# Patient Record
(demographics unavailable — no encounter records)

---

## 2024-11-05 NOTE — ASSESSMENT
[FreeTextEntry1] : suspect majority of problem is due to trigger finger of left 3rd digit  suggested and injected 5mg kenalog intop left 3rd A1 pulley  f/u 6 wks if still sx  Regarding treatment of trigger finger patient aware of possible lack of response to steroid injection and possible need for additional steroid injection or possible surgical trigger finger release.  All questions answered

## 2024-11-05 NOTE — HISTORY OF PRESENT ILLNESS
[FreeTextEntry1] : 61 yr old woman seen for left third finger arthritis  LHD  no injury  nonsmoker nondiabetic works as RN in [employer]   right hand fine left hand fine except for third finger

## 2024-11-05 NOTE — PHYSICAL EXAM
[NI] : Normal [de-identified] : left third finger tenderness over A1 pulley   mild 3rd PIPJ swelling

## 2024-12-17 NOTE — PHYSICAL EXAM
[NI] : Normal [de-identified] : left third finger tenderness over A1 pulley   mild 3rd PIPJ swelling

## 2024-12-17 NOTE — HISTORY OF PRESENT ILLNESS
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

## 2024-12-17 NOTE — ASSESSMENT
[FreeTextEntry1] : suspect majority of problem is due to trigger finger of left 3rd digit  suggested and injected 5mg kenalog intop left 3rd A1 pulley  f/u 6 wks if still sx  Regarding treatment of trigger finger patient aware of possible lack of response to steroid injection and possible need for additional steroid injection or possible surgical trigger finger release.  All questions answered  12/17/2024 as above some intermittent triggering left third  over left third A1 pulley tolerated well  Regarding treatment of trigger finger patient aware of possible lack of response to steroid injection and possible need for additional steroid injection or possible surgical trigger finger release.  All questions answered